# Patient Record
Sex: MALE | URBAN - METROPOLITAN AREA
[De-identification: names, ages, dates, MRNs, and addresses within clinical notes are randomized per-mention and may not be internally consistent; named-entity substitution may affect disease eponyms.]

---

## 2019-08-09 ENCOUNTER — APPOINTMENT (RX ONLY)
Dept: URBAN - METROPOLITAN AREA CLINIC 295 | Facility: CLINIC | Age: 49
Setting detail: DERMATOLOGY
End: 2019-08-09

## 2019-08-09 DIAGNOSIS — L0391 CELLULITIS AND ABSCESS OF UNSPECIFIED SITES: ICD-10-CM

## 2019-08-09 DIAGNOSIS — L0390 CELLULITIS AND ABSCESS OF UNSPECIFIED SITES: ICD-10-CM

## 2019-08-09 PROBLEM — L03.116 CELLULITIS OF LEFT LOWER LIMB: Status: ACTIVE | Noted: 2019-08-09

## 2019-08-09 PROCEDURE — 99202 OFFICE O/P NEW SF 15 MIN: CPT

## 2019-08-09 PROCEDURE — ? ADDITIONAL NOTES

## 2019-08-09 PROCEDURE — ? COUNSELING

## 2019-08-09 ASSESSMENT — LOCATION DETAILED DESCRIPTION DERM: LOCATION DETAILED: LEFT DISTAL PRETIBIAL REGION

## 2019-08-09 ASSESSMENT — LOCATION SIMPLE DESCRIPTION DERM: LOCATION SIMPLE: LEFT PRETIBIAL REGION

## 2019-08-09 ASSESSMENT — LOCATION ZONE DERM: LOCATION ZONE: LEG

## 2019-08-09 NOTE — PROCEDURE: ADDITIONAL NOTES
Additional Notes: Patient was on abx cipro prescribed by PCP 1 week ago 8/1/19. Had fevers last Wednesday and thursday 8-9 days ago with nausea and headaches.  Abx changed last Saturday to bactrim with some improvement Went to [facility], 8/1/19 UA negative and blood work positive. Patient shows improvement today, per Dr. Aguirre, continue to take bactrim and if symptoms worsen or no Longer improve return to ER. Additional Notes: Patient was on abx cipro prescribed by PCP 1 week ago 8/1/19. Had fevers last Wednesday and thursday 8-9 days ago with nausea and headaches.  Abx changed last Saturday to bactrim with some improvement Went to [facility], 8/1/19 UA negative and blood work positive. Patient shows improvement today, per Dr. Aguirre, continue to take bactrim and if symptoms worsen or no Longer improve return to ER.

## 2019-08-09 NOTE — PROCEDURE: MIPS QUALITY
Quality 130: Documentation Of Current Medications In The Medical Record: Current Medications Documented
Quality 128: Preventive Care And Screening: Body Mass Index (Bmi) Screening And Follow-Up Plan: BMI not documented, reason not otherwise specified.
Detail Level: Detailed
Quality 431: Preventive Care And Screening: Unhealthy Alcohol Use - Screening: Patient screened for unhealthy alcohol use using a single question and scores less than 2 times per year
Quality 226: Preventive Care And Screening: Tobacco Use: Screening And Cessation Intervention: Tobacco Screening not Performed for Unknown Reasons

## 2019-08-09 NOTE — HPI: RASH
What Type Of Note Output Would You Prefer (Optional)?: Bullet Format
How Severe Is Your Rash?: moderate
Is This A New Presentation, Or A Follow-Up?: Rash
Additional History: Patient portal weight 230, height 73 inches